# Patient Record
Sex: FEMALE | Race: BLACK OR AFRICAN AMERICAN | NOT HISPANIC OR LATINO | Employment: FULL TIME | URBAN - METROPOLITAN AREA
[De-identification: names, ages, dates, MRNs, and addresses within clinical notes are randomized per-mention and may not be internally consistent; named-entity substitution may affect disease eponyms.]

---

## 2024-08-19 ENCOUNTER — OCCUPATIONAL HEALTH (OUTPATIENT)
Dept: URGENT CARE | Facility: CLINIC | Age: 24
End: 2024-08-19

## 2024-08-19 DIAGNOSIS — Z13.9 ENCOUNTER FOR SCREENING: Primary | ICD-10-CM

## 2024-11-19 ENCOUNTER — OCCUPATIONAL HEALTH (OUTPATIENT)
Dept: URGENT CARE | Facility: CLINIC | Age: 24
End: 2024-11-19

## 2024-11-19 DIAGNOSIS — Z23 ENCOUNTER FOR IMMUNIZATION: Primary | ICD-10-CM

## 2025-05-22 ENCOUNTER — HOSPITAL ENCOUNTER (EMERGENCY)
Facility: OTHER | Age: 25
Discharge: HOME | End: 2025-05-22
Attending: EMERGENCY MEDICINE
Payer: MEDICAID

## 2025-05-22 VITALS
WEIGHT: 200 LBS | RESPIRATION RATE: 18 BRPM | OXYGEN SATURATION: 100 % | HEIGHT: 63 IN | DIASTOLIC BLOOD PRESSURE: 85 MMHG | SYSTOLIC BLOOD PRESSURE: 117 MMHG | TEMPERATURE: 98 F | HEART RATE: 70 BPM | BODY MASS INDEX: 35.44 KG/M2

## 2025-05-22 DIAGNOSIS — R13.10 ODYNOPHAGIA: Primary | ICD-10-CM

## 2025-05-22 DIAGNOSIS — R13.10 PAINFUL SWALLOWING: ICD-10-CM

## 2025-05-22 LAB
B-HCG UR QL: NEGATIVE
CTP QC/QA: YES
GROUP A STREP MOLECULAR (OHS): NEGATIVE
HCV AB SERPL QL IA: NEGATIVE
HIV 1+2 AB+HIV1 P24 AG SERPL QL IA: NEGATIVE
HOLD SPECIMEN: 1

## 2025-05-22 PROCEDURE — 25000003 PHARM REV CODE 250: Performed by: EMERGENCY MEDICINE

## 2025-05-22 PROCEDURE — 87389 HIV-1 AG W/HIV-1&-2 AB AG IA: CPT | Performed by: NURSE PRACTITIONER

## 2025-05-22 PROCEDURE — 99284 EMERGENCY DEPT VISIT MOD MDM: CPT | Mod: 25

## 2025-05-22 PROCEDURE — 87651 STREP A DNA AMP PROBE: CPT | Performed by: NURSE PRACTITIONER

## 2025-05-22 PROCEDURE — 81025 URINE PREGNANCY TEST: CPT | Performed by: NURSE PRACTITIONER

## 2025-05-22 PROCEDURE — 86803 HEPATITIS C AB TEST: CPT | Performed by: NURSE PRACTITIONER

## 2025-05-22 RX ORDER — LIDOCAINE HYDROCHLORIDE 20 MG/ML
15 SOLUTION OROPHARYNGEAL ONCE
Status: COMPLETED | OUTPATIENT
Start: 2025-05-22 | End: 2025-05-22

## 2025-05-22 RX ORDER — ALUMINUM HYDROXIDE, MAGNESIUM HYDROXIDE, AND SIMETHICONE 1200; 120; 1200 MG/30ML; MG/30ML; MG/30ML
30 SUSPENSION ORAL ONCE
Status: COMPLETED | OUTPATIENT
Start: 2025-05-22 | End: 2025-05-22

## 2025-05-22 RX ORDER — FAMOTIDINE 20 MG/1
20 TABLET, FILM COATED ORAL 2 TIMES DAILY
Qty: 28 TABLET | Refills: 0 | Status: SHIPPED | OUTPATIENT
Start: 2025-05-22 | End: 2025-06-05

## 2025-05-22 RX ADMIN — LIDOCAINE HYDROCHLORIDE 15 ML: 20 SOLUTION ORAL at 10:05

## 2025-05-22 RX ADMIN — ALUMINUM HYDROXIDE, MAGNESIUM HYDROXIDE, AND DIMETHICONE 30 ML: 200; 20; 200 SUSPENSION ORAL at 10:05

## 2025-05-23 NOTE — FIRST PROVIDER EVALUATION
" Emergency Department TeleTriage Encounter Note      CHIEF COMPLAINT    Chief Complaint   Patient presents with    Abdominal Pain     Epigastric/MS chest discomfort with swallowing food only x2 days. No pain currently. Pt last ate at 1815 when she experienced discomfort. Denies pmh reflux, no meds attempted, no recent injury.        VITAL SIGNS   Initial Vitals [05/22/25 1908]   BP Pulse Resp Temp SpO2   111/75 76 16 98 °F (36.7 °C) 100 %      MAP       --            ALLERGIES    Review of patient's allergies indicates:  No Known Allergies    PROVIDER TRIAGE NOTE  Pain with swallowing in the middle of her chest. Symptoms began two days ago; denies any other upper respiratory symptoms. Denies abdominal pain. No similar symptoms in the past.     Limited physical exam via telehealth: The patient is awake, alert, answering questions appropriately and is not in respiratory distress.  As the Teletriage provider, I performed an initial assessment and ordered appropriate labs and imaging studies, if any, to facilitate the patient's care once placed in the ED. Once a room is available, care and a full evaluation will be completed by an alternate ED provider.  Any additional orders and the final disposition will be determined by that provider.  All imaging and labs will not be followed-up by the Teletriage Team, including myself.          ORDERS  Labs Reviewed   HEPATITIS C ANTIBODY   HEP C VIRUS HOLD SPECIMEN   HIV 1 / 2 ANTIBODY       ED Orders (720h ago, onward)      Start Ordered     Status Ordering Provider    05/22/25 1934 05/22/25 1933  POCT urine pregnancy  Once         Ordered GERALD WILSON    05/22/25 1934 05/22/25 1933  X-Ray Chest PA And Lateral  1 time imaging         Ordered GERALD WILSON    05/22/25 1933 05/22/25 1932  Group A Strep, Molecular  STAT         Ordered GERALD WILSON    05/22/25 1913 05/22/25 1912  Hepatitis C Antibody  STAT        Placed in "And" Linked Group    Ordered WIN MCFADDEN " "   05/22/25 1913 05/22/25 1912  HCV Virus Hold Specimen  STAT        Placed in "And" Linked Group    Ordered WIN MCFADDEN.    05/22/25 1913 05/22/25 1912  HIV 1/2 Ag/Ab (4th Gen)  STAT         Ordered WIN MCFADDEN.              Virtual Visit Note: The provider triage portion of this emergency department evaluation and documentation was performed via [employer], a HIPAA-compliant telemedicine application, in concert with a tele-presenter in the room. A face to face patient evaluation with one of my colleagues will occur once the patient is placed in an emergency department room.      DISCLAIMER: This note was prepared with [employer] voice recognition transcription software. Garbled syntax, mangled pronouns, and other bizarre constructions may be attributed to that software system.    "

## 2025-05-23 NOTE — ED PROVIDER NOTES
Source of History:  Patient and chart    Chief complaint:  Abdominal Pain (Epigastric/MS chest discomfort with swallowing food only x2 days. No pain currently. Pt last ate at 1815 when she experienced discomfort. Denies pmh reflux, no meds attempted, no recent injury. )      HPI:  Renetta Ayala is a 24 y.o. female with a medical history as below presents to the emergency department with a chief complaint of esophageal pain with swallowing.  Patient has noticed the pain for a proximally 2 days.  She only notices it when she swallows.  Feels like a food bolus creating irritation as it goes down the esophagus into the stomach.  She had not has a history of GERD.  It is not feel like a burning sensation.  That has not positional, but only when she swallows.  She denies shortness of breath, fever, chills, nausea, vomiting, coughing, congestion, or respiratory difficulties, or any other symptoms.  She has been able to swallow food and fluids without difficulty.  And has no further concerns at this time.    Review of patient's allergies indicates:  No Known Allergies    Medications Ordered Prior to Encounter[1]    PMH:  As per HPI and below:  No past medical history on file.  No past surgical history on file.    Social History     Socioeconomic History    Marital status: Single       No family history on file.    Physical Exam:      Vitals:    05/22/25 2350   BP: 117/85   Pulse: 70   Resp: 18   Temp: 98 °F (36.7 °C)     Physical Exam    Gen:  Hemodynamically stable in no acute distress  Mental Status:   Alert and oriented x3.  Appropriate conversant   Skin: Warm, dry. No rashes seen.  Eyes: No conjunctival injection.  Pulm: CTAB. No increased work of breathing.  No significant tachypnea.  No conversational dyspnea.    CV:  Normal rate  Abd: Soft.  Not distended.  Nontender.   MSK: No deformities.    Neuro: Awake. Speech normal. No focal neuro deficit observed.  Throat is unremarkable.  No sign of infection or  "lesions.    Procedures  Laboratory Studies:  Labs Reviewed   GROUP A STREP, MOLECULAR - Normal       Result Value    Group A Strep Molecular Negative      Narrative:     Arcanobacterium haemolyticum and Beta Streptococcus group C and G will not be detected by this test method.  Please order Throat Culture ([id number]) if suspected.                                       HEPATITIS C ANTIBODY - Normal    Hep C Ab Interp Negative     HIV 1 / 2 ANTIBODY - Normal    HIV 1/2 Ag/Ab Negative     HEP C VIRUS HOLD SPECIMEN    Extra Tube 1     POCT URINE PREGNANCY    POC Preg Test, Ur Negative       Acceptable Yes         Chart reviewed.  No relevant information in chart    Imaging Results              X-Ray Chest PA And Lateral (Final result)  Result time 05/22/25 20:48:17      Final result by Jasvir Gaston MD (05/22/25 20:48:17)                   Impression:      No acute cardiopulmonary finding.      Electronically signed by: Jasvir Gaston MD  Date:    05/22/2025  Time:    20:48               Narrative:    EXAMINATION:  XR CHEST PA AND LATERAL    CLINICAL HISTORY:  Provided history is "  Dysphagia, unspecified".    TECHNIQUE:  Frontal and lateral views of the chest were performed.    COMPARISON:  None.    FINDINGS:  Cardiac silhouette is not enlarged. No focal consolidation.  No sizable pleural effusion.  No pneumothorax.                                      Medications Given:  Medications   aluminum-magnesium hydroxide-simethicone 200-200-20 mg/5 mL suspension 30 mL (30 mLs Oral Given 5/22/25 2224)     And   LIDOcaine viscous HCl 2% oral solution 15 mL (15 mLs Oral Given 5/22/25 2224)       ED Course as of 05/23/25 0009   Thu May 22, 2025   2213 X-Ray Chest PA And Lateral  Per my independent interpretation no obvious consolidation or mediastinal abnormality on chest x-ray [VC]   Fri May 23, 2025   0006 Group A Strep, Molecular  Negative with the appropriate controls [VC]      ED Course User Index  [VC] " Raulito Garza MD           MDM:    24 y.o. female with esophageal/epigastric pain with swallowing    Differential includes but isn't limited to gastritis, acid reflux, esophageal dysmotility, esophageal diverticulum    Patient reports minimal improvement of symptoms after GI cocktail.  X-rays unremarkable.  Patient has not had routine GI care that has never been diagnosed with a gastritis or GERD.  Patient is able to swallow without difficulty.  That has signs of active infection or acute pathology which will require intervention tonight.  We have discharge the patient with famotidine and ambulatory consult to Gastroenterology.  Launch MDCalc MDM  MDCalc MDM Module  May 23 2025 12:08 AM [Raulito Garza]  Data:  - Independent interpretation: I independently reviewed the XR Chest PA+Lat. It showed no acute abnormality. See MDM section and/or ED Course for my interpretation. [Raulito Garza]  - Test/documents/historian: 2 tests ordered  Risk: aluminum-magnesium hydroxide-simethicone suspension / (OTC drug management), XR Chest PA+Lat (X-ray)        Medical Decision Making  Amount and/or Complexity of Data Reviewed  Radiology:  Decision-making details documented in ED Course.         Diagnostic Impression:    1. Odynophagia    2. Painful swallowing       ED Disposition Condition    Discharge Stable          ED Prescriptions       Medication Sig Dispense Start Date End Date Auth. Provider    famotidine (PEPCID) 20 MG tablet Take 1 tablet (20 mg total) by mouth 2 (two) times daily. for 14 days 28 tablet 5/22/2025 6/5/2025 Golden Webster MD          Follow-up Information       Follow up With Specialties Details Why Contact Info    Isael Helton MD Gastroenterology Schedule an appointment as soon as possible for a visit   [address]  [address]  [facility]  [facility] [address]  [phone]                Patient and/or family understands the plan and is in agreement, verbalized  understanding, questions answered       OB History    No obstetric history on file.                [1]   No current facility-administered medications on file prior to encounter.     No current outpatient medications on file prior to encounter.        Raulito Garza MD  Resident  05/23/25 0009

## 2025-05-23 NOTE — DISCHARGE INSTRUCTIONS
Your symptoms may be due to acid reflux or possible esophageal dysmotility/stricture.  Start taking the Pepcid and see if your symptoms improve.  You may need to follow-up with the GI specialists for an EGD for further evaluation.    Please return to the ER if you experience severe chest pain, difficulty breathing/swallowing, vomiting or inability to hold down food/fluids, high fever, or any other new symptoms.    Thank you for choosing [facility]!     Our goal in the Emergency Department is to always provide outstanding medical care. You may receive a survey by mail or e-mail in the next week regarding your experience today. We would greatly appreciate you completing and returning the survey. Your feedback provides us with a way to recognize our staff who provide very good care, and it helps us learn how to improve when your experience was below our aspiration of excellence.      It is important to remember that some problems are difficult to diagnose and may not be found during your first visit. Be sure to follow up with your primary care doctor and review any labs/imaging that was performed during your visit with them. If you do not have a primary care doctor, you may contact the one listed on your discharge paperwork, or you may also call the [facility] Appointment Desk at [phone] to schedule an appointment.     All medications may potentially have side effects and it is impossible to predict which medications may give you side effects. If you feel that you are having a negative effect of any medication you should immediately stop taking them and seek medical attention.  Do not drive or make any important decisions for 24 hours if you have received any pain medications, sedatives or mood altering drugs during your ER visit.    We appreciate you trusting us with your medical care. We will be happy to take care of you for all of your future medical needs. You may return to the ER at any time  for any new/concerning symptoms, worsening condition, or failure to improve. We hope you feel better soon.     Sincerely,    Golden Webster Jr., MD  Board-Certified Emergency Medicine Physician  [facility]